# Patient Record
Sex: FEMALE | Race: WHITE | NOT HISPANIC OR LATINO | Employment: UNEMPLOYED | URBAN - NONMETROPOLITAN AREA
[De-identification: names, ages, dates, MRNs, and addresses within clinical notes are randomized per-mention and may not be internally consistent; named-entity substitution may affect disease eponyms.]

---

## 2023-10-17 ENCOUNTER — OFFICE VISIT (OUTPATIENT)
Dept: PRIMARY CARE | Facility: CLINIC | Age: 48
End: 2023-10-17
Payer: COMMERCIAL

## 2023-10-17 ENCOUNTER — ANCILLARY PROCEDURE (OUTPATIENT)
Dept: RADIOLOGY | Facility: CLINIC | Age: 48
End: 2023-10-17
Payer: COMMERCIAL

## 2023-10-17 VITALS
BODY MASS INDEX: 28.17 KG/M2 | OXYGEN SATURATION: 94 % | HEART RATE: 71 BPM | DIASTOLIC BLOOD PRESSURE: 80 MMHG | SYSTOLIC BLOOD PRESSURE: 128 MMHG | WEIGHT: 174.5 LBS

## 2023-10-17 DIAGNOSIS — G89.29 CHRONIC LEFT SHOULDER PAIN: ICD-10-CM

## 2023-10-17 DIAGNOSIS — M25.512 CHRONIC LEFT SHOULDER PAIN: ICD-10-CM

## 2023-10-17 DIAGNOSIS — Z76.89 ENCOUNTER TO ESTABLISH CARE: Primary | ICD-10-CM

## 2023-10-17 PROCEDURE — 99203 OFFICE O/P NEW LOW 30 MIN: CPT | Performed by: STUDENT IN AN ORGANIZED HEALTH CARE EDUCATION/TRAINING PROGRAM

## 2023-10-17 PROCEDURE — 73030 X-RAY EXAM OF SHOULDER: CPT | Mod: LEFT SIDE | Performed by: RADIOLOGY

## 2023-10-17 PROCEDURE — 1036F TOBACCO NON-USER: CPT | Performed by: STUDENT IN AN ORGANIZED HEALTH CARE EDUCATION/TRAINING PROGRAM

## 2023-10-17 PROCEDURE — 73030 X-RAY EXAM OF SHOULDER: CPT | Mod: LT,FY

## 2023-10-17 RX ORDER — ROFLUMILAST 3 MG/G
CREAM TOPICAL DAILY
COMMUNITY

## 2023-10-17 RX ORDER — FLUTICASONE PROPIONATE 50 MCG
SPRAY, SUSPENSION (ML) NASAL
COMMUNITY

## 2023-10-17 NOTE — PROGRESS NOTES
Subjective   Patient ID: Molly Piper is a 47 y.o. female who presents for New Patient (Establish care. C/O pain in left shoulder has been to PT a couple times. ).    HPI    Patient is here to establish care.     Symptoms started in January.  Initially thought it was sore muscle but this is nor improving.   Has been to physical therapist about two times. She was given exercises to do which has helped some. Pain is worse at rest and turning sides in her sleep. Pain may wakes her up from sleep.   Goes to [employer] gym. Since July she has modified activities. Lifting overhead and push ups are most painful.  Reports that the pain is in the insertion area of deltoid.   Plan: exam concerning for labral tear/rotator cuff injury vs bursitis. Xray is ordered. Physical therapy is ordered. May need MRI if not improving.     Review of Systems  ROS negative except discussed above in HPI.    Vitals:    10/17/23 1359   BP: 128/80   Pulse: 71   SpO2: 94%     Objective   Physical Exam  Shoulder Musculoskeletal Exam    Range of Motion    Left      Active ROM: normal.       Passive ROM: normal.     Special Tests    Left     Rotator Cuff Signs      Neer's test: negative     Instability Signs      Joint laxity: negative   Crank test positive. Pain with abduction to 90 and internal rotation. No point tenderness.     Assessment/Plan   Molly was seen today for new patient.  Diagnoses and all orders for this visit:  Encounter to establish care (Primary)  Chronic left shoulder pain  -     XR shoulder left 2+ views; Future  -     Referral to Occupational Therapy; Future  -     Referral to Physical Therapy; Future      Follow up in 6 weeks.                                                Kris Sullivan MD MPH

## 2023-10-26 PROBLEM — E66.3 OVERWEIGHT WITH BODY MASS INDEX (BMI) OF 28 TO 28.9 IN ADULT: Status: ACTIVE | Noted: 2023-10-26

## 2023-10-26 PROBLEM — L02.91 SKIN ABSCESS: Status: ACTIVE | Noted: 2023-10-26

## 2023-10-26 RX ORDER — DOXYCYCLINE 100 MG/1
TABLET ORAL
COMMUNITY
End: 2023-12-01 | Stop reason: ALTCHOICE

## 2023-10-27 ENCOUNTER — EVALUATION (OUTPATIENT)
Dept: PHYSICAL THERAPY | Facility: CLINIC | Age: 48
End: 2023-10-27
Payer: COMMERCIAL

## 2023-10-27 DIAGNOSIS — M25.512 CHRONIC LEFT SHOULDER PAIN: Primary | ICD-10-CM

## 2023-10-27 DIAGNOSIS — G89.29 CHRONIC LEFT SHOULDER PAIN: Primary | ICD-10-CM

## 2023-10-27 PROCEDURE — 97110 THERAPEUTIC EXERCISES: CPT | Mod: GP

## 2023-10-27 PROCEDURE — 97161 PT EVAL LOW COMPLEX 20 MIN: CPT | Mod: GP

## 2023-10-27 ASSESSMENT — PATIENT HEALTH QUESTIONNAIRE - PHQ9
1. LITTLE INTEREST OR PLEASURE IN DOING THINGS: NOT AT ALL
SUM OF ALL RESPONSES TO PHQ9 QUESTIONS 1 AND 2: 0
2. FEELING DOWN, DEPRESSED OR HOPELESS: NOT AT ALL

## 2023-10-27 ASSESSMENT — PAIN SCALES - GENERAL: PAINLEVEL_OUTOF10: 0 - NO PAIN

## 2023-10-27 ASSESSMENT — PAIN - FUNCTIONAL ASSESSMENT: PAIN_FUNCTIONAL_ASSESSMENT: 0-10

## 2023-10-27 NOTE — Clinical Note
October 29, 2023     Patient: Molly Piper   YOB: 1975   Date of Visit: 10/27/2023       To Whom it May Concern:    Molly Piper was seen in my clinic on 10/27/2023. She {Return to school/sport:59350}.    If you have any questions or concerns, please don't hesitate to call.         Sincerely,          Farrah Jara, PT        CC: No Recipients

## 2023-10-27 NOTE — PROGRESS NOTES
Physical Therapy    Physical Therapy Evaluation and Treatment      Patient Name: Molly Piper  MRN: [id number]  Today's Date: 10/27/2023  Time Calculation  Start Time: 0917  Stop Time: 0954  Time Calculation (min): 37 min      Assessment:  PT Assessment Results: Decreased strength, Pain  Rehab Prognosis: Good  Evaluation/Treatment Tolerance: Patient tolerated treatment well  Strengths: Ability to acquire knowledge, Capable of completing ADLs semi/independent, Rehab experience, Premorbid level of function, Physical health  Barriers to Participation: Other (Comment) (chronicity of pain)  Patient demonstrating mostly weakness of L GH joint/periscapular musculature strength which influences ability to complete self-care, household, work, and athletic related activities. Patient would benefit from skilled PT 1x/week for 3 weeks to address above impairments and improve ease with ADL/iADL completion. Educated and review of current HEP to include serratus strengthening as well as scapular strengthening with good tolerance and no exacerbation of pain. HEP handout provided. 0/10 pain of L GH joint at end of session.    Plan:  Treatment/Interventions: Blood flow restriction therapy, Cryotherapy, Education/ Instruction, Manual therapy, Therapeutic exercises, Taping techniques  PT Plan: Skilled PT  PT Frequency: 1 time per week  Duration: 3 weeks for additional 3 visits    Current Problem:   1. Chronic left shoulder pain  Referral to Physical Therapy          Subjective    General: Patient reporting chronic L shoulder pain starting in January and then recent exacerbation of pain in August of this year. Pain in L lateral deltoid region, biceps, motion with long lever arm such as horizontal abduction and abduction. Patient continuing to complete exercises at [employer] but modifies exercises. If no pain, she completes, otherwise needs to modify. Unable to complete push-ups. Patient is R hand dominant. Does complete scapular  strengthening as well as rotator cuff strengthening which did help improve overall. Wanting to complete formal PT in case of needing further imaging.  General  Reason for Referral: L shoulder pain  Referred By: Nate AKHTAR  Past Medical History Relevant to Rehab: Completes PT exercises at [employer] gym  Precautions:  Precautions  STEADI Fall Risk Score (The score of 4 or more indicates an increased risk of falling): 0  Precautions Comment: Hx of basal cell carcinoma    Pain:  Pain Assessment  Pain Assessment: 0-10  Pain Score: 0 - No pain (pain with motion)  Pain Type: Chronic pain  Pain Location: Shoulder  Pain Orientation: Left  Home Living:   No concerns with home set-up  Prior Level of Function:  Prior Function Per Pt/Caregiver Report  Level of Curry: Independent with ADLs and functional transfers    Objective     Shoulder    Shoulder Palpation/Joint Mobility Assessment  Shoulder Palpation/Joint Mobility Comment: restriction of L biceps, lateral deltoid    Shoulder AROM WFL unless documented below  L Shoulder flexion: (180°): 175 deg (90 deg pain starts)  L Shoulder abduction: (180°): 180 deg (135 deg pain starts)  L shoulder ER: (90°): T2  L shoulder IR: (70°): T8    Shoulder Strength WFL unless documented below  L shoulder flexion: (5/5): 4  R shoulder abduction: (5/5): 4  L shoulder ER: (5/5): 4  L shoulder IR: (5/5): 4  Scapular MMT WFL unless documented below  L lower trapezius: (5/5): 4-  L middle trapezius: (5/5): 4  L rhomboids: (5/5): 4-    Outcome Measures:  Other Measures  Disability of Arm Shoulder Hand (DASH): 21=22.73%     Treatments:  Therapeutic Exercise  Therapeutic Exercise Performed: Yes  Prone L scapular Y, T, row x10 each  Serratus flexion green band x10  Review of current HEP    OP EDUCATION:  Education  Individual(s) Educated: Patient  Education Provided: Home Exercise Program, POC  Risk and Benefits Discussed with Patient/Caregiver/Other: yes  Patient/Caregiver Demonstrated  Understanding: yes  Plan of Care Discussed and Agreed Upon: yes  Patient Response to Education: Patient/Caregiver Verbalized Understanding of Information, Patient/Caregiver Performed Return Demonstration of Exercises/Activities, Patient/Caregiver Asked Appropriate Questions  Education Comment: Access Code: [email or web address]  URL: [email or web address]  Date: 10/27/2023  Prepared by: Farrah Jara    Exercises  - Shoulder Flexion Serratus Activation with Resistance  - 1 x daily - 7 x weekly - 1-2 sets - 10 reps  - Prone Scapular Retraction Y  - 1 x daily - 7 x weekly - 1-2 sets - 10 reps  - Prone Scapular Retraction Arms at Side  - 1 x daily - 7 x weekly - 1-2 sets - 10 reps  - Prone Scapular Retraction and Row  - 1 x daily - 7 x weekly - 1-2 sets - 10 reps    Goals:  Active       Goals       PT Goals       Start:  10/29/23    Expected End:  11/26/23       Decrease in QDASH score by 10 points to demonstrate improved functional mobility of L shoulder with completion of ADLs/iADLs-Week 3  Reduction of L shoulder pain to 0/10 max to improve QOL and completion of Cross[employer] exercises.-Week 3  Increase in gross L GH joint/periscapular musculature strength 5/5 MMT to improve stability with ADL/iADL completion-Week 3  Patient will demonstrate compliance in their home exercise program in order to promote independence in self management of functional mobility.-Week 1

## 2023-10-27 NOTE — Clinical Note
October 29, 2023     Patient: Molly Piper   YOB: 1975   Date of Visit: 10/27/2023       To Whom It May Concern:    It is my medical opinion that Molly Piper {Work release (duty restriction):30567}.    If you have any questions or concerns, please don't hesitate to call.         Sincerely,        Farrah Jara, PT    CC: No Recipients

## 2023-11-09 ENCOUNTER — TREATMENT (OUTPATIENT)
Dept: PHYSICAL THERAPY | Facility: CLINIC | Age: 48
End: 2023-11-09
Payer: COMMERCIAL

## 2023-11-09 DIAGNOSIS — M25.512 CHRONIC LEFT SHOULDER PAIN: ICD-10-CM

## 2023-11-09 DIAGNOSIS — G89.29 CHRONIC LEFT SHOULDER PAIN: ICD-10-CM

## 2023-11-09 PROCEDURE — 97110 THERAPEUTIC EXERCISES: CPT | Mod: GP,CQ

## 2023-11-09 PROCEDURE — 97140 MANUAL THERAPY 1/> REGIONS: CPT | Mod: GP,CQ

## 2023-11-09 ASSESSMENT — PAIN - FUNCTIONAL ASSESSMENT: PAIN_FUNCTIONAL_ASSESSMENT: 0-10

## 2023-11-09 ASSESSMENT — PAIN SCALES - GENERAL: PAINLEVEL_OUTOF10: 0 - NO PAIN

## 2023-11-09 NOTE — PROGRESS NOTES
Physical Therapy Treatment    Patient Name: Molly Piper  MRN: [id number]  Today's Date: 11/9/2023  Time Calculation  Start Time: 0956  Stop Time: 1045  Time Calculation (min): 49 min      Assessment:   Patient identified by name and date of birth. Patient was able to progress with several exercises with fatigue and no pain noted. She required max cues to relax with PROM and muscle restrictions noted with STW that responded well.     Plan:  OP PT Plan  Treatment/Interventions: Blood flow restriction therapy, Cryotherapy, Education/ Instruction, Manual therapy, Therapeutic exercises, Taping techniques  PT Plan: Skilled PT  PT Frequency: 1 time per week  Duration: 3 weeks for additional 3 visits    Current Problem  Problem List Items Addressed This Visit             ICD-10-CM       Musculoskeletal and Injuries    Chronic left shoulder pain M25.512, G89.29       Subjective  Patient reported she experienced increased Sx when she was sleeping. She reported she was able to perform wall balls at the gym without pain this week.  She reported muscle fatigue w/o increased pain after treatment. Patient reported she is compliant with HEP.     Reason for Referral: L shoulder pain  Referred By: Nate AKHTAR  Past Medical History Relevant to Rehab: Completes PT exercises at [employer] gym  Precautions  Precautions  Precautions Comment: Hx of basal cell carcinoma    Pain  Pain Assessment: 0-10  Pain Score: 0 - No pain     Treatments:  Therapeutic Exercise  Therapeutic Exercise Performed: Yes  UBE 3' fwd 3' bwd   ER 2 x 10 orange band  IR 2 x 10 orange band   Stabilize and reach x10 loop band (N)  Serratus flexion yellow loop band 2x10  Ball on wall flex/ext, lateral, CW, CCW 2 x 10 each   Prone L scapular Y, T, row x15 each   Seated B ER 2 x 10 green  Seated B ABD 2 x 10 green      Manual;   PROM STW to upper UE, UT and scap boarder L      OP EDUCATION:   Access Code: [email or web address]  URL:  [email or web address]  Date: 11/09/2023  Prepared by: Caryl Garsia    Exercises  - Standing Wall Ball Circles with Mini Swiss Ball  - 1 x daily - 7 x weekly - 2 sets - 10 reps  - Shoulder External Rotation and Scapular Retraction with Resistance  - 1 x daily - 7 x weekly - 2 sets - 10 reps  - Seated Shoulder Horizontal Abduction with Resistance - Thumbs Up  - 1 x daily - 7 x weekly - 2 sets - 10 reps  - Seated Single Arm Shoulder PNF D1 Flexion  - 1 x daily - 7 x weekly - 2 sets - 10 reps  - Shoulder PNF D2 Flexion  - 1 x daily - 7 x weekly - 2 sets - 10 reps  - Shoulder Internal Rotation with Resistance  - 1 x daily - 7 x weekly - 2 sets - 10 reps  - Shoulder External Rotation with Anchored Resistance  - 1 x daily - 7 x weekly - 2 sets - 10 reps    Goals:  Active       Goals       PT Goals       Start:  10/29/23    Expected End:  11/26/23       Decrease in QDASH score by 10 points to demonstrate improved functional mobility of L shoulder with completion of ADLs/iADLs-Week 3  Reduction of L shoulder pain to 0/10 max to improve QOL and completion of Cross[employer] exercises.-Week 3  Increase in gross L GH joint/periscapular musculature strength 5/5 MMT to improve stability with ADL/iADL completion-Week 3  Patient will demonstrate compliance in their home exercise program in order to promote independence in self management of functional mobility.-Week 1

## 2023-11-16 ENCOUNTER — TREATMENT (OUTPATIENT)
Dept: PHYSICAL THERAPY | Facility: CLINIC | Age: 48
End: 2023-11-16
Payer: COMMERCIAL

## 2023-11-16 DIAGNOSIS — G89.29 CHRONIC LEFT SHOULDER PAIN: ICD-10-CM

## 2023-11-16 DIAGNOSIS — M25.512 CHRONIC LEFT SHOULDER PAIN: ICD-10-CM

## 2023-11-16 PROCEDURE — 97110 THERAPEUTIC EXERCISES: CPT | Mod: GP,CQ

## 2023-11-16 ASSESSMENT — PAIN SCALES - GENERAL: PAINLEVEL_OUTOF10: 0 - NO PAIN

## 2023-11-16 ASSESSMENT — PAIN - FUNCTIONAL ASSESSMENT: PAIN_FUNCTIONAL_ASSESSMENT: 0-10

## 2023-11-16 NOTE — PROGRESS NOTES
Physical Therapy Treatment    Patient Name: Molly Piper  MRN: [id number]  Today's Date: 11/16/2023  Time Calculation  Start Time: 0958  Stop Time: 1044  Time Calculation (min): 46 min      Assessment:   Patient identified by name and date of birth. Added 1/2 foam roll this date patient demonstrated fair tolerance with increased Sx noted. She demonstrated good understanding of HEP with verbal review. She demonstrated fatigue with exercises and required short rest breaks at times.      Plan:  OP PT Plan  Treatment/Interventions: Blood flow restriction therapy, Cryotherapy, Education/ Instruction, Manual therapy, Therapeutic exercises, Taping techniques  PT Plan: Skilled PT  PT Frequency: 1 time per week  Duration: 3 weeks for additional 3 visits    Continue with progression of shoulder/UE strengthening and ROM for increased ease with putting her coat on and off.     Current Problem  Problem List Items Addressed This Visit             ICD-10-CM       Musculoskeletal and Injuries    Chronic left shoulder pain M25.512, G89.29       Subjective Patient reported compliance with % of the time. Patient reported 0/10 pain with muscle fatigue after treatment. She reported she had preformed several arm exercises at the gym prior to treatment this date.   General  Reason for Referral: L shoulder pain  Referred By: Nate AKHTAR  Past Medical History Relevant to Rehab: Completes PT exercises at [employer]  Precautions  Precautions  Precautions Comment: Hx of basal cell carcinoma    Pain  Pain Assessment: 0-10  Pain Score: 0 - No pain     Treatments:  Therapeutic Exercise  Therapeutic Exercise Performed: Yes  UBE 3' fwd 3' bwd   ER 2 x 10 green band (P)  IR 2 x 10 green band in small range (P)  Stabilize and reach x10 green loop band (N)  Serratus flexion green loop band 2x10   Ball on wall flex/ext, lateral, CW, CCW 2 x 10 each   1/2 foam roll (N)  -pec stretch  -field goals x10  -hugs x10   -angels x10   Prone L  "scapular Y, T, row x15 each 3\" hold   Seated B ER 2 x 10 green  Seated B ABD 2 x 10 green   D1 and D2 flexion x10   IR strap stretch (A)      Manual;   PROM STW to upper UE, UT and scap boarder L      OP EDUCATION:  Access Code: [email or web address]  URL: [email or web address]  Date: 11/16/2023  Prepared by: Caryl Walker    Exercises  - Supine Chest Stretch on Foam Roll  - 1 x daily - 7 x weekly - 2 sets - 10 reps  - Snow [place] on Foam Roll  - 1 x daily - 7 x weekly - 2 sets - 10 reps  - Thoracic Foam Roll Mobilization Hug  - 1 x daily - 7 x weekly - 2 sets - 10 reps  - Overhead Reach on Foam Roll  - 1 x daily - 7 x weekly - 2 sets - 10 reps  - Standing Plank on Wall with Reaches and Resistance  - 1 x daily - 7 x weekly - 3 sets - 10 reps      Access Code: [email or web address]  URL: [email or web address]  Date: 11/09/2023  Prepared by: Caryl Walker     Exercises  - Standing Wall Ball Circles with Mini Swiss Ball  - 1 x daily - 7 x weekly - 2 sets - 10 reps  - Shoulder External Rotation and Scapular Retraction with Resistance  - 1 x daily - 7 x weekly - 2 sets - 10 reps  - Seated Shoulder Horizontal Abduction with Resistance - Thumbs Up  - 1 x daily - 7 x weekly - 2 sets - 10 reps  - Seated Single Arm Shoulder PNF D1 Flexion  - 1 x daily - 7 x weekly - 2 sets - 10 reps  - Shoulder PNF D2 Flexion  - 1 x daily - 7 x weekly - 2 sets - 10 reps  - Shoulder Internal Rotation with Resistance  - 1 x daily - 7 x weekly - 2 sets - 10 reps  - Shoulder External Rotation with Anchored Resistance  - 1 x daily - 7 x weekly - 2 sets - 10 reps    Goals:  Active       Goals       PT Goals       Start:  10/29/23    Expected End:  11/26/23       Decrease in QDASH score by 10 points to demonstrate improved functional mobility of L shoulder with completion of ADLs/iADLs-Week 3  Reduction of L shoulder pain to 0/10 max to improve QOL and completion of [employer] exercises.-Week 3  Increase in gross L GH " joint/periscapular musculature strength 5/5 MMT to improve stability with ADL/iADL completion-Week 3  Patient will demonstrate compliance in their home exercise program in order to promote independence in self management of functional mobility.-Week 1

## 2023-11-27 ENCOUNTER — TREATMENT (OUTPATIENT)
Dept: PHYSICAL THERAPY | Facility: CLINIC | Age: 48
End: 2023-11-27
Payer: COMMERCIAL

## 2023-11-27 DIAGNOSIS — G89.29 CHRONIC LEFT SHOULDER PAIN: ICD-10-CM

## 2023-11-27 DIAGNOSIS — M25.512 CHRONIC LEFT SHOULDER PAIN: ICD-10-CM

## 2023-11-27 PROCEDURE — 97110 THERAPEUTIC EXERCISES: CPT | Mod: GP

## 2023-11-27 ASSESSMENT — PAIN SCALES - GENERAL: PAINLEVEL_OUTOF10: 0 - NO PAIN

## 2023-11-27 ASSESSMENT — PAIN - FUNCTIONAL ASSESSMENT: PAIN_FUNCTIONAL_ASSESSMENT: 0-10

## 2023-11-27 NOTE — PROGRESS NOTES
Physical Therapy Treatment    Patient Name: Molly Piper  MRN: [id number]  Today's Date: 11/27/2023  Time Calculation  Start Time: 0930  Stop Time: 1002  Time Calculation (min): 32 min      Assessment:   Molly Piper is progressing well through their POC addressing L shoulder pain. Pt has attended 4 sessions including initial evaluation with therapeutic exercise, manual therapy, and HEP interventions. The pt demonstrates and verbalizes improvements in L GH joint stability and functional mobility per QDASH. However, patient continues to have pain of L shoulder especially with control during motion of all planes. Noted pain with flex to 90 deg x1. Patient may benefit from further imaging to better guide further interventions for continued pain of L shoulder. Updated HEP with periscapular strengthening and shoulder mobility. HEP handout provided. No change in pain post-treatment.    Pt is being placed on hold for 30 days to attempt performing their home exercise program independently. Pt instructed to contact with any problems, questions, or adjustments. This will serve as the patient's discharge if they elect not to resume skilled PT within 30 days. Pt verbalized understanding and agreement to goals and POC. Thank you for this referral and please call [phone] with any questions or concerns.    Plan:  Pt is being placed on hold for 30 days to attempt performing their home exercise program independently. Pt instructed to contact with any problems, questions, or adjustments. This will serve as the patient's discharge if they elect not to resume skilled PT within 30 days.     OP PT Plan  PT Plan: No Additional PT interventions required at this time    Current Problem  Problem List Items Addressed This Visit             ICD-10-CM    Chronic left shoulder pain M25.512, G89.29         Subjective   General   Patient reporting compliance with HEP. Does still have days with pain of shoulder but some days the pain  "does not remain. Has follow-up with referring provider this Friday. Pain with moving shoulder and feels a catching sensation sometimes.  Precautions  Precautions  Precautions Comment: Hx of basal cell carcinoma    Pain  Pain Assessment: 0-10  Pain Score: 0 - No pain      OBJECTIVE:   QDASH: 21>18 points  L GH joint ROM: WFL in all planes  L GH joint flex: 4+/5 MMT with pain/catching to L GH joint AROM at 90 deg  L GH joint ER/IR: 4+/5 MMT   Treatments:  Therapeutic Exercise  Therapeutic Exercise Performed: Yes  HEP and POC review  UBE 2' fwd 2' bwd LEVEL 2.0 (P, resist)   L shoulder ER 2 x 10 blue band (P)  L shoulder IR 2 x 10 blue band in small range (P)  Stabilize and reach x10 green loop band  Ball on wall flex/ext, lateral, CW, CCW 2 x 10 each   D1 and D2 flexion x10   IR strap stretch 3x20 second N  1/2 foam roll  -angels x10 standing  Prone L scapular Y, T, row x15 each 3\" hold   Below not performed  Seated B ER 2 x 10 greenX  Seated B ABD 2 x 10 greenX   Serratus flexion green loop band 2x10 X        Manual;   PROM STW to upper UE, UT and scap boarder L X     OP EDUCATION:  Access Code: [email or web address]  URL: [email or web address]  Date: 11/27/2023  Prepared by: Farrah Jara    Exercises  - Standing Shoulder Internal Rotation Stretch with Towel  - 1 x daily - 7 x weekly - 1 sets - 3 reps - 20-30second hold  - Prone Single Arm Shoulder Horizontal Abduction with Scapular Retraction and Palm Down  - 1 x daily - 7 x weekly - 1-2 sets - 10 reps  - Prone Scapular Retraction Y  - 1 x daily - 7 x weekly - 1-2 sets - 10 reps  - Prone Scapular Retraction and Row  - 1 x daily - 7 x weekly - 1-2 sets - 10 reps  Access Code: [email or web address]  URL: [email or web address]  Date: 11/16/2023  Prepared by: Caryl Garsia    Exercises  - Supine Chest Stretch on Foam Roll  - 1 x daily - 7 x weekly - 2 sets - 10 reps  - Snow [place] on Foam Roll  - 1 x daily - 7 x weekly - 2 sets - 10 reps  - " Thoracic Foam Roll Mobilization Hug  - 1 x daily - 7 x weekly - 2 sets - 10 reps  - Overhead Reach on Foam Roll  - 1 x daily - 7 x weekly - 2 sets - 10 reps  - Standing Plank on Wall with Reaches and Resistance  - 1 x daily - 7 x weekly - 3 sets - 10 reps    Outpatient Education  Individual(s) Educated: Patient  Education Provided: POC, Home Exercise Program  Risk and Benefits Discussed with Patient/Caregiver/Other: yes  Patient/Caregiver Demonstrated Understanding: yes  Plan of Care Discussed and Agreed Upon: yes  Patient Response to Education: Patient/Caregiver Verbalized Understanding of Information, Patient/Caregiver Performed Return Demonstration of Exercises/Activities, Patient/Caregiver Asked Appropriate Questions  Education Comment: Access Code: [email or web address]  URL: [email or web address]  Date: 11/27/2023  Prepared by: Farrah Jara    Exercises  - Standing Shoulder Internal Rotation Stretch with Towel  - 1 x daily - 7 x weekly - 1 sets - 3 reps - 20-30second hold  - Prone Single Arm Shoulder Horizontal Abduction with Scapular Retraction and Palm Down  - 1 x daily - 7 x weekly - 1-2 sets - 10 reps  - Prone Scapular Retraction Y  - 1 x daily - 7 x weekly - 1-2 sets - 10 reps  - Prone Scapular Retraction and Row  - 1 x daily - 7 x weekly - 1-2 sets - 10 reps Access Code: [email or web address]  URL: [email or web address]  Date: 11/09/2023  Prepared by: Caryl Garsia     Exercises  - Standing Wall Ball Circles with Mini Swiss Ball  - 1 x daily - 7 x weekly - 2 sets - 10 reps  - Shoulder External Rotation and Scapular Retraction with Resistance  - 1 x daily - 7 x weekly - 2 sets - 10 reps  - Seated Shoulder Horizontal Abduction with Resistance - Thumbs Up  - 1 x daily - 7 x weekly - 2 sets - 10 reps  - Seated Single Arm Shoulder PNF D1 Flexion  - 1 x daily - 7 x weekly - 2 sets - 10 reps  - Shoulder PNF D2 Flexion  - 1 x daily - 7 x weekly - 2 sets - 10 reps  - Shoulder Internal  Rotation with Resistance  - 1 x daily - 7 x weekly - 2 sets - 10 reps  - Shoulder External Rotation with Anchored Resistance  - 1 x daily - 7 x weekly - 2 sets - 10 reps    Goals:  Active       Goals       PT Goals       Start:  10/29/23    Expected End:  11/26/23       Decrease in QDASH score by 10 points to demonstrate improved functional mobility of L shoulder with completion of ADLs/iADLs-Week 3-PARTIALLY MET  Reduction of L shoulder pain to 0/10 max to improve QOL and completion of Cross[employer] exercises.-Week 3-NOT MET  Increase in gross L GH joint/periscapular musculature strength 5/5 MMT to improve stability with ADL/iADL completion-Week 3-PARTIALLY MET  Patient will demonstrate compliance in their home exercise program in order to promote independence in self management of functional mobility.-Week 1-MET

## 2023-12-01 ENCOUNTER — OFFICE VISIT (OUTPATIENT)
Dept: PRIMARY CARE | Facility: CLINIC | Age: 48
End: 2023-12-01
Payer: COMMERCIAL

## 2023-12-01 VITALS
HEART RATE: 78 BPM | WEIGHT: 190.1 LBS | OXYGEN SATURATION: 96 % | DIASTOLIC BLOOD PRESSURE: 78 MMHG | BODY MASS INDEX: 30.68 KG/M2 | SYSTOLIC BLOOD PRESSURE: 124 MMHG

## 2023-12-01 DIAGNOSIS — M25.512 CHRONIC LEFT SHOULDER PAIN: Primary | ICD-10-CM

## 2023-12-01 DIAGNOSIS — Z00.00 HEALTHCARE MAINTENANCE: ICD-10-CM

## 2023-12-01 DIAGNOSIS — G89.29 CHRONIC LEFT SHOULDER PAIN: Primary | ICD-10-CM

## 2023-12-01 PROCEDURE — 99213 OFFICE O/P EST LOW 20 MIN: CPT | Performed by: STUDENT IN AN ORGANIZED HEALTH CARE EDUCATION/TRAINING PROGRAM

## 2023-12-01 PROCEDURE — 1036F TOBACCO NON-USER: CPT | Performed by: STUDENT IN AN ORGANIZED HEALTH CARE EDUCATION/TRAINING PROGRAM

## 2023-12-01 ASSESSMENT — PATIENT HEALTH QUESTIONNAIRE - PHQ9
SUM OF ALL RESPONSES TO PHQ9 QUESTIONS 1 AND 2: 0
1. LITTLE INTEREST OR PLEASURE IN DOING THINGS: NOT AT ALL
2. FEELING DOWN, DEPRESSED OR HOPELESS: NOT AT ALL

## 2023-12-01 NOTE — PROGRESS NOTES
Subjective   Patient ID: Molly Piper is a 48 y.o. female who presents for Follow-up (PT is here for 6 week FUV. Reports left shoulder pain that started almost a year ago. Does not know if there was an injury. Reports she did do the PT and reports shoulder is slightly better but does not know if it was from the PT. Did have an x-ray done as well. ).    HPI    Here for follow up regarding shoulder pain. Has undergone PT. Sleeping better at night due to slight improvement in her shoulder pain. But continues to have significant symptoms.   Hard to put coats on. Also difficult to take clothes off.       Review of Systems  ROS negative except discussed above in HPI.    Vitals:    12/01/23 1518   BP: 124/78   Pulse: 78   SpO2: 96%     Objective   Physical Exam  Constitutional:       Appearance: Normal appearance.   Neurological:      Mental Status: She is alert.       Assessment/Plan   Molly was seen today for follow-up.  Diagnoses and all orders for this visit:  Chronic left shoulder pain (Primary)  -     Referral to Orthopaedic Surgery; Future  Healthcare maintenance  -     Lipid Panel; Future  -     TSH with reflex to Free T4 if abnormal; Future  -     CBC and Auto Differential; Future  -     Comprehensive Metabolic Panel; Future      Follow up as needed.          Kris Sullivan MD MPH

## 2023-12-08 DIAGNOSIS — M25.512 CHRONIC LEFT SHOULDER PAIN: Primary | ICD-10-CM

## 2023-12-08 DIAGNOSIS — G89.29 CHRONIC LEFT SHOULDER PAIN: Primary | ICD-10-CM

## 2023-12-21 ENCOUNTER — OFFICE VISIT (OUTPATIENT)
Dept: ORTHOPEDIC SURGERY | Facility: CLINIC | Age: 48
End: 2023-12-21
Payer: COMMERCIAL

## 2023-12-21 DIAGNOSIS — M25.512 CHRONIC LEFT SHOULDER PAIN: ICD-10-CM

## 2023-12-21 DIAGNOSIS — G89.29 CHRONIC LEFT SHOULDER PAIN: ICD-10-CM

## 2023-12-21 PROCEDURE — 1036F TOBACCO NON-USER: CPT | Performed by: SPECIALIST

## 2023-12-21 PROCEDURE — 99204 OFFICE O/P NEW MOD 45 MIN: CPT | Performed by: SPECIALIST

## 2023-12-21 RX ORDER — DICLOFENAC SODIUM 10 MG/G
4 GEL TOPICAL 4 TIMES DAILY PRN
Qty: 100 G | Refills: 1 | Status: SHIPPED | OUTPATIENT
Start: 2023-12-21

## 2023-12-21 RX ORDER — IBUPROFEN 600 MG/1
600 TABLET ORAL EVERY 6 HOURS PRN
Qty: 28 TABLET | Refills: 2 | Status: SHIPPED | OUTPATIENT
Start: 2023-12-21

## 2023-12-21 ASSESSMENT — PAIN - FUNCTIONAL ASSESSMENT: PAIN_FUNCTIONAL_ASSESSMENT: 0-10

## 2023-12-21 ASSESSMENT — PAIN DESCRIPTION - DESCRIPTORS: DESCRIPTORS: SHARP;ACHING

## 2023-12-21 ASSESSMENT — PAIN SCALES - GENERAL: PAINLEVEL_OUTOF10: 0 - NO PAIN

## 2023-12-21 NOTE — ASSESSMENT & PLAN NOTE
Assessment: Left shoulder impingement syndrome with subacromial bursitis    Plan:    We discussed subacromial cortisone injections.  This was deferred for now.  Continue the home exercises from the physical therapy.  Rest from [employer] or at the least avoid activities that aggravate the shoulder which would be any overhead activities including push-ups and bench pressing etc.  Voltaren gel on a as needed basis as directed  Motrin 600 mg p.o. 3 times daily with meals as needed pain.  Use this for several days in a row and if she is getting benefit she can slowly wean from it.  Follow-up in 4 to 6 weeks for reevaluation.

## 2023-12-21 NOTE — PROGRESS NOTES
Assessment/Plan   Encounter Diagnoses:  Chronic left shoulder pain  No problem-specific Assessment & Plan notes found for this encounter.     Chronic left shoulder pain  Assessment: Left shoulder impingement syndrome with subacromial bursitis    Plan:    We discussed subacromial cortisone injections.  This was deferred for now.  Continue the home exercises from the physical therapy.  Rest from [employer] or at the least avoid activities that aggravate the shoulder which would be any overhead activities including push-ups and bench pressing etc.  Voltaren gel on a as needed basis as directed  Motrin 600 mg p.o. 3 times daily with meals as needed pain.  Use this for several days in a row and if she is getting benefit she can slowly wean from it.  Follow-up in 4 to 6 weeks for reevaluation.     Subjective    Patient ID: Molly Piper is a 48 y.o. female.    Chief Complaint: Pain of the Left Shoulder     Last Surgery: No surgery found  Last Surgery Date: No surgery found    HPI  48-year-old female who states that she has had about 9 to 10 months of left shoulder pain.  She thinks in August she really injured her left shoulder.  She does CrossFit and overhead lifting activity aggravates her shoulder.  Push-ups aggravate her shoulder.  She has some discomfort which sometimes wakes her at night.  She has not taken any nonsteroidal anti-inflammatories for this.  She has made some progress recently as she has cut back on her activity levels and been doing her therapy.  She does admit she has not been diligent with the therapy.    OBJECTIVE: ORTHO EXAM  Left shoulder:  Inspection:  Skin healthy to gross inspection  No ecchymosis, no edema, no gross atrophy    Palpation:  Acromioclavicular joint min tenderness   Biceps tendon/ groove minimal tenderness  Anterior Acromial Bursal Area moderate tenderness  Cervical spine no tenderness     ROM:  Forward Flexion 140 degrees active then pain but can forward elevate to 170  External  Rotation 70 degrees at 90 degrees abduction  Internal Rotation 70 degrees at 90 degrees abduction    Strength:  4+/5 Supraspinatus isolation- resisted elevation  5 -/5 Infraspinatus isolation- ER  5/5 Subscapularis- IR     Negative lift off test   Negative Spurling´s test  Positive Neer and Hawking´s test  Negative Speed's test  Negative Inferior Sulcus  Negative Anterior Apprehension    Full unrestricted motion at Elbow/Wrist/Hand  Neurovascular exam normal distally        IMAGE RESULTS:  XR shoulder left 2+ views  Narrative: Interpreted By:  Chastity Zavala,   STUDY:  Left shoulder, 5 views.      INDICATION:  Signs/Symptoms:left shoulder pain..      COMPARISON:  None.      ACCESSION NUMBER(S):  [id number]      ORDERING CLINICIAN:  ETHAN YANEZ      FINDINGS:  No acute fracture or malalignment.  No significant degenerative changes.  Soft tissues are unremarkable.      Impression: 1. Unremarkable left shoulder radiographs.      MACRO:      None.      Signed by: Chastity Zavala 10/19/2023 6:15 AM  Dictation workstation:   [id number]      DIAGNOSTIC ULTRASOUND FINAL REPORT: Left SHOULDER  Provider: Josep Hoover MD  Date of Exam: Today  Procedure: Ultrasound, extremity, nonvascular, real-time, COMPLETE, anatomic specific.  Site:   SHOULDER  Indication:  SHOULDER PAIN  Technique: B-Mode Ultrasound Examination performed using 8-13 MHz linear transducer with [employer] Software  STUDY TYPE:   1. ULTRASOUND EXTREMITY INCLUDING BUT NOT LIMITED TO SHOULDER MUSCLE, TENDONS, LIGAMENTS, FATTY TISSUES, SUBCUTANEOUS TISSUES AND OTHER SOFT TISSUE STRUCTURES SUCH AS ABSCESSES OR FREE FLUID ACCUMULATION WITHIN THE PRIMARY JOINT AS WELL AS ADJACENT JOINTS.  2. REAL TIME WITH IMAGE DOCUMENTATION  3. NON-VASCULAR  4. COMPLETE STUDY WHICH INCLUDES A THOROUGH EVALUATION OF THE SHOULDER MUSCLE, TENDONS, LIGAMENTS, FATTY TISSUES, SUBCUTANEOUS TISSUES AND OTHER SOFT TISSUE STRUCTURES SUCH AS ABSCESSES OR FREE FLUID ACCUMULATION WITHIN  THE PRIMARY JOINT AS WELL AS ADJACENT JOINTS.  Live ultrasound was performed of the patient´s  SHOULDER and PERMANENTLY documented.  This is a thorough and complete evaluation of a specific anatomic region specifically the  SHOULDER.  PERMANENT Image documentation was performed.  This is the complete and final ultrasound report of the patient's  SHOULDER.  The patient was positioned in order to optimize the ultrasound evaluation of the  SHOULDER.  Ultrasound gel was used as a conductive medium in order to both transmit and receive ultrasonic signals that characterize the soft tissues. . I personally performed the ultrasound and reviewed the findings. These show:  Findings:  SHOULDER  Yesi-articular evaluation: Live ultrasound was performed of the patient's  SHOULDER that shows tendinosis appearance of the supraspinatus and infraspinatus tendons with some possible partial-thickness tearing with the deltoid muscle fibers showing normal striations.  There was minimal sub acromial effusion.  There was some thickening of the subacromial bursa seen.  Evaluation of the subscapularis with the arm in external rotation showed an intact and normal appearing subscapularis tendon.    Joint Evaluation: The biceps tendon was visualized within the bicipital groove.    Procedures     Orders Placed This Encounter    Point of Care Ultrasound

## 2023-12-22 ENCOUNTER — APPOINTMENT (OUTPATIENT)
Dept: RADIOLOGY | Facility: HOSPITAL | Age: 48
End: 2023-12-22
Payer: COMMERCIAL

## 2024-02-01 ENCOUNTER — OFFICE VISIT (OUTPATIENT)
Dept: ORTHOPEDIC SURGERY | Facility: CLINIC | Age: 49
End: 2024-02-01
Payer: COMMERCIAL

## 2024-02-01 DIAGNOSIS — M75.42 IMPINGEMENT SYNDROME OF LEFT SHOULDER: Primary | ICD-10-CM

## 2024-02-01 DIAGNOSIS — M25.512 CHRONIC LEFT SHOULDER PAIN: ICD-10-CM

## 2024-02-01 DIAGNOSIS — G89.29 CHRONIC LEFT SHOULDER PAIN: ICD-10-CM

## 2024-02-01 PROCEDURE — 1036F TOBACCO NON-USER: CPT | Performed by: SPECIALIST

## 2024-02-01 PROCEDURE — 99214 OFFICE O/P EST MOD 30 MIN: CPT | Performed by: SPECIALIST

## 2024-02-01 ASSESSMENT — PAIN SCALES - GENERAL: PAINLEVEL_OUTOF10: 0 - NO PAIN

## 2024-02-01 ASSESSMENT — PAIN - FUNCTIONAL ASSESSMENT: PAIN_FUNCTIONAL_ASSESSMENT: 0-10

## 2024-02-01 ASSESSMENT — PAIN DESCRIPTION - DESCRIPTORS: DESCRIPTORS: DULL

## 2024-02-01 NOTE — ASSESSMENT & PLAN NOTE
Assessment: Left shoulder impingement syndrome.    Plan:  Continue her home exercise program.  Continue the use of the Voltaren gel.  Use as directed.  Continue her activity restriction and avoiding those activities that aggravate her shoulder.  Relative rest  I discussed the possibility of cortisone injections but she states she is making progress and does not feel she needs that treatment at this time.  Follow-up in 2 months for reevaluation.

## 2024-02-01 NOTE — PROGRESS NOTES
Assessment/Plan   Encounter Diagnoses:  Impingement syndrome of left shoulder    Chronic left shoulder pain  Impingement syndrome of left shoulder  Assessment: Left shoulder impingement syndrome.    Plan:  Continue her home exercise program.  Continue the use of the Voltaren gel.  Use as directed.  Continue her activity restriction and avoiding those activities that aggravate her shoulder.  Relative rest  I discussed the possibility of cortisone injections but she states she is making progress and does not feel she needs that treatment at this time.  Follow-up in 2 months for reevaluation.     Impingement syndrome of left shoulder  Assessment: Left shoulder impingement syndrome.    Plan:  Continue her home exercise program.  Continue the use of the Voltaren gel.  Use as directed.  Continue her activity restriction and avoiding those activities that aggravate her shoulder.  Relative rest  I discussed the possibility of cortisone injections but she states she is making progress and does not feel she needs that treatment at this time.  Follow-up in 2 months for reevaluation.     Subjective    Patient ID: Molly Piper is a 48 y.o. female.    Chief Complaint: Follow-up and Pain of the Left Shoulder     Last Surgery: No surgery found  Last Surgery Date: No surgery found    HPI  48-year-old female who states that she has had about 9 to 10 months of left shoulder pain.  She thinks in August she really injured her left shoulder.  She does Cross[employer] and overhead lifting activity aggravates her shoulder.  Push-ups aggravate her shoulder.  She has some discomfort which sometimes wakes her at night.  She has not taken any nonsteroidal anti-inflammatories for this.  She has made some progress recently as she has cut back on her activity levels and been doing her therapy.  She does admit she has not been diligent with the therapy.    2/1/24-making good progress.  She rested for about 10 days around the holidays and since has  been careful with her activities and using the Voltaren gel.  She is pleased with her early progress.      OBJECTIVE: ORTHO EXAM  Left shoulder:  Inspection:  Skin healthy to gross inspection  No ecchymosis, no edema, no gross atrophy    Palpation:  Acromioclavicular joint min tenderness   Biceps tendon/ groove minimal tenderness  Anterior Acromial Bursal Area moderate tenderness  Cervical spine no tenderness     ROM:  Forward Flexion 140 degrees active then pain but can forward elevate to 170  External Rotation 70 degrees at 90 degrees abduction  Internal Rotation 70 degrees at 90 degrees abduction    Strength:  4+/5 Supraspinatus isolation- resisted elevation  5 -/5 Infraspinatus isolation- ER  5/5 Subscapularis- IR     Negative lift off test   Negative Spurling´s test  Positive Neer and Hawking´s test  Negative Speed's test  Negative Inferior Sulcus  Negative Anterior Apprehension    Full unrestricted motion at Elbow/Wrist/Hand  Neurovascular exam normal distally        IMAGE RESULTS:  XR shoulder left 2+ views  Narrative: Interpreted By:  Chastity Zavala,   STUDY:  Left shoulder, 5 views.      INDICATION:  Signs/Symptoms:left shoulder pain..      COMPARISON:  None.      ACCESSION NUMBER(S):  [id number]      ORDERING CLINICIAN:  ETHAN YANEZ      FINDINGS:  No acute fracture or malalignment.  No significant degenerative changes.  Soft tissues are unremarkable.      Impression: 1. Unremarkable left shoulder radiographs.      MACRO:      None.      Signed by: Chastity Zavala 10/19/2023 6:15 AM  Dictation workstation:   [id number]      DIAGNOSTIC ULTRASOUND FINAL REPORT: Left SHOULDER  Provider: Josep Hoover MD  Date of Exam: Today  Procedure: Ultrasound, extremity, nonvascular, real-time, COMPLETE, anatomic specific.  Site:   SHOULDER  Indication:  SHOULDER PAIN  Technique: B-Mode Ultrasound Examination performed using 8-13 MHz linear transducer with [employer]  STUDY TYPE:   1. ULTRASOUND EXTREMITY  INCLUDING BUT NOT LIMITED TO SHOULDER MUSCLE, TENDONS, LIGAMENTS, FATTY TISSUES, SUBCUTANEOUS TISSUES AND OTHER SOFT TISSUE STRUCTURES SUCH AS ABSCESSES OR FREE FLUID ACCUMULATION WITHIN THE PRIMARY JOINT AS WELL AS ADJACENT JOINTS.  2. REAL TIME WITH IMAGE DOCUMENTATION  3. NON-VASCULAR  4. COMPLETE STUDY WHICH INCLUDES A THOROUGH EVALUATION OF THE SHOULDER MUSCLE, TENDONS, LIGAMENTS, FATTY TISSUES, SUBCUTANEOUS TISSUES AND OTHER SOFT TISSUE STRUCTURES SUCH AS ABSCESSES OR FREE FLUID ACCUMULATION WITHIN THE PRIMARY JOINT AS WELL AS ADJACENT JOINTS.  Live ultrasound was performed of the patient´s  SHOULDER and PERMANENTLY documented.  This is a thorough and complete evaluation of a specific anatomic region specifically the  SHOULDER.  PERMANENT Image documentation was performed.  This is the complete and final ultrasound report of the patient's  SHOULDER.  The patient was positioned in order to optimize the ultrasound evaluation of the  SHOULDER.  Ultrasound gel was used as a conductive medium in order to both transmit and receive ultrasonic signals that characterize the soft tissues. . I personally performed the ultrasound and reviewed the findings. These show:  Findings:  SHOULDER  Yesi-articular evaluation: Live ultrasound was performed of the patient's  SHOULDER that shows tendinosis appearance of the supraspinatus and infraspinatus tendons with some possible partial-thickness tearing with the deltoid muscle fibers showing normal striations.  There was minimal sub acromial effusion.  There was some thickening of the subacromial bursa seen.  Evaluation of the subscapularis with the arm in external rotation showed an intact and normal appearing subscapularis tendon.    Joint Evaluation: The biceps tendon was visualized within the bicipital groove.    Procedures     Orders Placed This Encounter    Point of Care Ultrasound

## 2024-03-28 ENCOUNTER — APPOINTMENT (OUTPATIENT)
Dept: ORTHOPEDIC SURGERY | Facility: CLINIC | Age: 49
End: 2024-03-28
Payer: COMMERCIAL

## 2024-07-23 ENCOUNTER — LAB (OUTPATIENT)
Dept: LAB | Facility: LAB | Age: 49
End: 2024-07-23
Payer: COMMERCIAL

## 2024-07-23 DIAGNOSIS — Z00.00 HEALTHCARE MAINTENANCE: ICD-10-CM

## 2024-07-23 LAB
ALBUMIN SERPL BCP-MCNC: 4.3 G/DL (ref 3.4–5)
ALP SERPL-CCNC: 56 U/L (ref 33–110)
ALT SERPL W P-5'-P-CCNC: 17 U/L (ref 7–45)
ANION GAP SERPL CALC-SCNC: 10 MMOL/L (ref 10–20)
AST SERPL W P-5'-P-CCNC: 18 U/L (ref 9–39)
BASOPHILS # BLD AUTO: 0.03 X10*3/UL (ref 0–0.1)
BASOPHILS NFR BLD AUTO: 0.7 %
BILIRUB SERPL-MCNC: 1.2 MG/DL (ref 0–1.2)
BUN SERPL-MCNC: 23 MG/DL (ref 6–23)
CALCIUM SERPL-MCNC: 9.1 MG/DL (ref 8.6–10.3)
CHLORIDE SERPL-SCNC: 104 MMOL/L (ref 98–107)
CHOLEST SERPL-MCNC: 170 MG/DL (ref 0–199)
CHOLESTEROL/HDL RATIO: 2.7
CO2 SERPL-SCNC: 30 MMOL/L (ref 21–32)
CREAT SERPL-MCNC: 0.76 MG/DL (ref 0.5–1.05)
EGFRCR SERPLBLD CKD-EPI 2021: >90 ML/MIN/1.73M*2
EOSINOPHIL # BLD AUTO: 0.25 X10*3/UL (ref 0–0.7)
EOSINOPHIL NFR BLD AUTO: 6 %
ERYTHROCYTE [DISTWIDTH] IN BLOOD BY AUTOMATED COUNT: 12.9 % (ref 11.5–14.5)
GLUCOSE SERPL-MCNC: 92 MG/DL (ref 74–99)
HCT VFR BLD AUTO: 43.8 % (ref 36–46)
HDLC SERPL-MCNC: 62 MG/DL
HGB BLD-MCNC: 14.5 G/DL (ref 12–16)
IMM GRANULOCYTES # BLD AUTO: 0.01 X10*3/UL (ref 0–0.7)
IMM GRANULOCYTES NFR BLD AUTO: 0.2 % (ref 0–0.9)
LDLC SERPL CALC-MCNC: 94 MG/DL
LYMPHOCYTES # BLD AUTO: 1.36 X10*3/UL (ref 1.2–4.8)
LYMPHOCYTES NFR BLD AUTO: 32.5 %
MCH RBC QN AUTO: 31.7 PG (ref 26–34)
MCHC RBC AUTO-ENTMCNC: 33.1 G/DL (ref 32–36)
MCV RBC AUTO: 96 FL (ref 80–100)
MONOCYTES # BLD AUTO: 0.33 X10*3/UL (ref 0.1–1)
MONOCYTES NFR BLD AUTO: 7.9 %
NEUTROPHILS # BLD AUTO: 2.2 X10*3/UL (ref 1.2–7.7)
NEUTROPHILS NFR BLD AUTO: 52.7 %
NON HDL CHOLESTEROL: 108 MG/DL (ref 0–149)
NRBC BLD-RTO: 0 /100 WBCS (ref 0–0)
PLATELET # BLD AUTO: 206 X10*3/UL (ref 150–450)
POTASSIUM SERPL-SCNC: 4.5 MMOL/L (ref 3.5–5.3)
PROT SERPL-MCNC: 6.4 G/DL (ref 6.4–8.2)
RBC # BLD AUTO: 4.57 X10*6/UL (ref 4–5.2)
SODIUM SERPL-SCNC: 139 MMOL/L (ref 136–145)
TRIGL SERPL-MCNC: 69 MG/DL (ref 0–149)
TSH SERPL-ACNC: 1.13 MIU/L (ref 0.44–3.98)
VLDL: 14 MG/DL (ref 0–40)
WBC # BLD AUTO: 4.2 X10*3/UL (ref 4.4–11.3)

## 2024-07-23 PROCEDURE — 36415 COLL VENOUS BLD VENIPUNCTURE: CPT

## 2024-07-23 PROCEDURE — 84443 ASSAY THYROID STIM HORMONE: CPT

## 2024-07-23 PROCEDURE — 80061 LIPID PANEL: CPT

## 2024-07-23 PROCEDURE — 80053 COMPREHEN METABOLIC PANEL: CPT

## 2024-07-23 PROCEDURE — 85025 COMPLETE CBC W/AUTO DIFF WBC: CPT

## 2024-07-24 ENCOUNTER — OFFICE VISIT (OUTPATIENT)
Dept: PRIMARY CARE | Facility: CLINIC | Age: 49
End: 2024-07-24
Payer: COMMERCIAL

## 2024-07-24 VITALS
HEART RATE: 87 BPM | WEIGHT: 184.7 LBS | OXYGEN SATURATION: 96 % | SYSTOLIC BLOOD PRESSURE: 102 MMHG | HEIGHT: 66 IN | DIASTOLIC BLOOD PRESSURE: 74 MMHG | BODY MASS INDEX: 29.68 KG/M2

## 2024-07-24 DIAGNOSIS — Z00.00 ROUTINE GENERAL MEDICAL EXAMINATION AT A HEALTH CARE FACILITY: Primary | ICD-10-CM

## 2024-07-24 PROCEDURE — 99396 PREV VISIT EST AGE 40-64: CPT | Performed by: STUDENT IN AN ORGANIZED HEALTH CARE EDUCATION/TRAINING PROGRAM

## 2024-07-24 PROCEDURE — 1036F TOBACCO NON-USER: CPT | Performed by: STUDENT IN AN ORGANIZED HEALTH CARE EDUCATION/TRAINING PROGRAM

## 2024-07-24 PROCEDURE — 3008F BODY MASS INDEX DOCD: CPT | Performed by: STUDENT IN AN ORGANIZED HEALTH CARE EDUCATION/TRAINING PROGRAM

## 2024-07-24 ASSESSMENT — PATIENT HEALTH QUESTIONNAIRE - PHQ9
2. FEELING DOWN, DEPRESSED OR HOPELESS: NOT AT ALL
SUM OF ALL RESPONSES TO PHQ9 QUESTIONS 1 AND 2: 0
1. LITTLE INTEREST OR PLEASURE IN DOING THINGS: NOT AT ALL

## 2024-07-24 NOTE — PROGRESS NOTES
Subjective   Patient ID: Molly Piper is a 48 y.o. female who presents for Annual Exam.    [facility]    Here for annual check up. .     Has had her blood work done.   Discussed results with patient.   Overall in normal range.     [facility] Res[facility] now.   Appears to be allergic dermatitis.     Family hx of cancers. Upto date on mammograms and PAP.   Has not had colonoscopy yet. Concerned about the cost of the test. Discussed alternatives available including cologuard. She will let me know if she would like to get cologuard instead.     Review of Systems  ROS negative except discussed above in HPI.    Vitals:    07/24/24 0912   BP: 102/74   Pulse: 87   SpO2: 96%     Objective   Physical Exam  Constitutional:       Appearance: Normal appearance.   HENT:      Right Ear: Tympanic membrane normal.      Left Ear: Tympanic membrane normal.   Cardiovascular:      Rate and Rhythm: Normal rate and regular rhythm.   Pulmonary:      Effort: Pulmonary effort is normal.      Breath sounds: Normal breath sounds.   Musculoskeletal:      Cervical back: Normal range of motion and neck supple.   Lymphadenopathy:      Cervical: No cervical adenopathy.   Skin:     Comments: Mild hyperpigmentation noticed with any erythema in the are of rash in bilateral lower extremities.    Neurological:      Mental Status: She is alert.           Assessment/Plan   Molly was seen today for annual exam.  Diagnoses and all orders for this visit:  Routine general medical examination at a health care facility ([facility])      Follow up as needed.          Kris Sullivan MD MPH

## 2024-09-05 ENCOUNTER — APPOINTMENT (OUTPATIENT)
Age: 49
End: 2024-09-05
Payer: COMMERCIAL

## 2024-09-06 ENCOUNTER — APPOINTMENT (OUTPATIENT)
Age: 49
End: 2024-09-06
Payer: COMMERCIAL

## 2024-11-04 ENCOUNTER — APPOINTMENT (OUTPATIENT)
Dept: PRIMARY CARE | Facility: CLINIC | Age: 49
End: 2024-11-04
Payer: COMMERCIAL

## 2025-01-13 ENCOUNTER — APPOINTMENT (OUTPATIENT)
Dept: PRIMARY CARE | Facility: CLINIC | Age: 50
End: 2025-01-13
Payer: COMMERCIAL

## 2025-01-13 VITALS
BODY MASS INDEX: 29.21 KG/M2 | SYSTOLIC BLOOD PRESSURE: 122 MMHG | WEIGHT: 181 LBS | OXYGEN SATURATION: 94 % | DIASTOLIC BLOOD PRESSURE: 74 MMHG | HEART RATE: 90 BPM

## 2025-01-13 DIAGNOSIS — J30.2 SEASONAL ALLERGIC RHINITIS, UNSPECIFIED TRIGGER: ICD-10-CM

## 2025-01-13 DIAGNOSIS — J45.20 MILD INTERMITTENT ASTHMA WITHOUT COMPLICATION (HHS-HCC): ICD-10-CM

## 2025-01-13 DIAGNOSIS — Z12.11 COLON CANCER SCREENING: Primary | ICD-10-CM

## 2025-01-13 PROCEDURE — 1036F TOBACCO NON-USER: CPT | Performed by: FAMILY MEDICINE

## 2025-01-13 PROCEDURE — 99214 OFFICE O/P EST MOD 30 MIN: CPT | Performed by: FAMILY MEDICINE

## 2025-01-13 RX ORDER — ALBUTEROL SULFATE 90 UG/1
2 INHALANT RESPIRATORY (INHALATION) EVERY 6 HOURS PRN
COMMUNITY

## 2025-01-13 NOTE — PROGRESS NOTES
"Subjective   Patient ID: Molly Piper is a 49 y.o. female who presents for Establish Care (Was Dr Sullivan patient).    HPI   Colon cancer screening   Fxhx neg colon cancer   Insurance will not cover     Breast Cancer screening  March 22, 2024 July 2024 results   The 10-year ASCVD risk score (Lonny MISTRY, et al., 2019) is: 0.6%    Values used to calculate the score:      Age: 49 years      Sex: Female      Is Non- : No      Diabetic: No      Tobacco smoker: No      Systolic Blood Pressure: 122 mmHg      Is BP treated: No      HDL Cholesterol: 62 mg/dL      Total Cholesterol: 170 mg/dL    H/o asthma in  2004   Using albuterol more  Zyrtec SE ?   DR Tellez allergy testing shots for 5 years   Pfts showed small airway disease   Over  10 years was doing well   Was uing old inhaler   March and fall   Has inhaler is current \"purple\" generic inhaler   April 2026 expires current   30 pufffs in the last 3 months       Derm Psoriasis uses clobetasol and zoryve uses every other day      Bra line and b/w breast      Itches no rash       Dr Vic mcdaniel derm Dr Ralph         H/o BCC scalp   Father h/o SCC    May call in spring if symptoms are worse and can try prednisone po or shot of kenalog         Review of Systems    Objective   /74   Pulse 90   Wt 82.1 kg (181 lb)   SpO2 94%   BMI 29.21 kg/m²     Physical Exam  Vitals reviewed.   Constitutional:       Appearance: Normal appearance.   HENT:      Head: Normocephalic and atraumatic.   Eyes:      Conjunctiva/sclera: Conjunctivae normal.   Cardiovascular:      Rate and Rhythm: Normal rate and regular rhythm.   Pulmonary:      Effort: Pulmonary effort is normal.      Breath sounds: Normal breath sounds.   Musculoskeletal:      Cervical back: Neck supple.   Skin:     General: Skin is warm and dry.   Neurological:      General: No focal deficit present.      Mental Status: She is alert and oriented to person, place, and time.   Psychiatric:   "       Mood and Affect: Mood normal.         Behavior: Behavior normal.         Thought Content: Thought content normal.         Judgment: Judgment normal.         Assessment/Plan   Diagnoses and all orders for this visit:  Colon cancer screening  -     Cologuard® colon cancer screening; Future  Seasonal allergic rhinitis, unspecified trigger  Mild intermittent asthma without complication ([facility])

## 2025-03-14 LAB — NONINV COLON CA DNA+OCC BLD SCRN STL QL: NEGATIVE

## 2026-01-15 ENCOUNTER — APPOINTMENT (OUTPATIENT)
Dept: PRIMARY CARE | Facility: CLINIC | Age: 51
End: 2026-01-15
Payer: COMMERCIAL